# Patient Record
Sex: MALE | Race: OTHER | Employment: UNEMPLOYED | URBAN - METROPOLITAN AREA
[De-identification: names, ages, dates, MRNs, and addresses within clinical notes are randomized per-mention and may not be internally consistent; named-entity substitution may affect disease eponyms.]

---

## 2022-07-11 ENCOUNTER — HOSPITAL ENCOUNTER (EMERGENCY)
Age: 27
Discharge: HOME | End: 2022-07-12
Attending: EMERGENCY MEDICINE
Payer: COMMERCIAL

## 2022-07-11 DIAGNOSIS — S01.81XA FACIAL LACERATION, INITIAL ENCOUNTER: Primary | ICD-10-CM

## 2022-07-11 PROCEDURE — 96372 THER/PROPH/DIAG INJ SC/IM: CPT

## 2022-07-11 PROCEDURE — 90715 TDAP VACCINE 7 YRS/> IM: CPT | Performed by: EMERGENCY MEDICINE

## 2022-07-11 PROCEDURE — 90471 IMMUNIZATION ADMIN: CPT | Performed by: EMERGENCY MEDICINE

## 2022-07-11 PROCEDURE — 99284 EMERGENCY DEPT VISIT MOD MDM: CPT

## 2022-07-11 PROCEDURE — 6360000002 HC RX W HCPCS: Performed by: EMERGENCY MEDICINE

## 2022-07-11 PROCEDURE — 12013 RPR F/E/E/N/L/M 2.6-5.0 CM: CPT

## 2022-07-11 RX ADMIN — TETANUS TOXOID, REDUCED DIPHTHERIA TOXOID AND ACELLULAR PERTUSSIS VACCINE, ADSORBED 0.5 ML: 5; 2.5; 8; 8; 2.5 SUSPENSION INTRAMUSCULAR at 23:30

## 2022-07-11 ASSESSMENT — PAIN - FUNCTIONAL ASSESSMENT: PAIN_FUNCTIONAL_ASSESSMENT: NONE - DENIES PAIN

## 2022-07-12 ENCOUNTER — APPOINTMENT (OUTPATIENT)
Dept: CT IMAGING | Age: 27
End: 2022-07-12
Payer: COMMERCIAL

## 2022-07-12 VITALS
SYSTOLIC BLOOD PRESSURE: 124 MMHG | OXYGEN SATURATION: 97 % | HEIGHT: 74 IN | HEART RATE: 59 BPM | DIASTOLIC BLOOD PRESSURE: 70 MMHG | WEIGHT: 230 LBS | TEMPERATURE: 98.4 F | RESPIRATION RATE: 16 BRPM | BODY MASS INDEX: 29.52 KG/M2

## 2022-07-12 PROCEDURE — 70450 CT HEAD/BRAIN W/O DYE: CPT

## 2022-07-12 PROCEDURE — 72125 CT NECK SPINE W/O DYE: CPT

## 2022-07-12 PROCEDURE — 2500000003 HC RX 250 WO HCPCS: Performed by: PHYSICIAN ASSISTANT

## 2022-07-12 RX ORDER — LIDOCAINE HYDROCHLORIDE 10 MG/ML
20 INJECTION, SOLUTION INFILTRATION; PERINEURAL ONCE
Status: COMPLETED | OUTPATIENT
Start: 2022-07-12 | End: 2022-07-12

## 2022-07-12 RX ADMIN — LIDOCAINE HYDROCHLORIDE 20 ML: 10 INJECTION, SOLUTION INFILTRATION; PERINEURAL at 02:47

## 2022-07-12 NOTE — ED NOTES
PROCEDURE NOTE  7/12/22       Time: 0200    LACERATION REPAIR  Risks, benefits and alternatives (for applicable procedures below) described. Performed By: COOKIE Larios. Informed consent: Verbal consent obtained. The patient was counseled regarding the procedure in person, it's indications, risks, potential complications and alternatives and any questions were answered. Verbal consent was obtained. Laceration #: 1. Location:  forehead  Length: 4.5 cm. The wound area was irrigated with sterile saline, cleansed with povidone iodine and draped in a sterile fashion. Local Anesthesia:  obtained with Lidocaine 1% without epinephrine. The wound was explored with the following results: Thickness: partial thickness. No foreign bodies found. Debridement: None. Undermining: partial thickness. Wound Margins Revised: yes. Flaps Aligned: no. The wound was closed in single layer closure with #6  6-0 Ethilon using interrupted suture(s). Dressing:  bacitracin and a sterile dressing. There were no additional wounds requiring formal closure.         Brittany Larios  07/12/22 0212

## 2022-07-12 NOTE — ED PROVIDER NOTES
HPI:  7/11/22,   Time: 11:20 PM EDT         Kayleen Brock is a 24 y.o. male presenting to the ED for laceration, beginning prior to arrival. The complaint has been persistent, mild in severity, and worsened by nothing. Patient is [place]. He said that he did not get his way this evening and began hitting his head against a window until he bled. Patient has a laceration in the center of his forehead. He denies any loss of consciousness. He denies any numbness, tingling, weakness, vision changes, speech changes. He does not know if his tetanus is up-to-date. ROS:   Pertinent positives and negatives are stated within HPI, all other systems reviewed and are negative.  --------------------------------------------- PAST HISTORY ---------------------------------------------  Past Medical History:  has no past medical history on file. Past Surgical History:  has no past surgical history on file. Social History:      Family History: family history is not on file. The patients home medications have been reviewed. Allergies: Patient has no known allergies. -------------------------------------------------- RESULTS -------------------------------------------------  All laboratory and radiology results have been personally reviewed by myself   LABS:  No results found for this visit on 07/11/22. RADIOLOGY:  Interpreted by Radiologist.  [address]   Final Result   No CT evidence of an acute intracranial abnormality. No evidence of acute fracture or traumatic malalignment of the cervical spine. RECOMMENDATIONS:   Unavailable         CT CERVICAL SPINE WO CONTRAST   Final Result   No CT evidence of an acute intracranial abnormality. No evidence of acute fracture or traumatic malalignment of the cervical spine.       RECOMMENDATIONS:   Unavailable                 ---------------------------------------------------PHYSICAL fracture or traumatic malalignment of the cervical spine. RECOMMENDATIONS:   Unavailable         CT CERVICAL SPINE WO CONTRAST   Final Result   No CT evidence of an acute intracranial abnormality. No evidence of acute fracture or traumatic malalignment of the cervical spine. RECOMMENDATIONS:   Unavailable             ------------------------- NURSING NOTES AND VITALS REVIEWED ---------------------------  Date / Time Roomed:  7/11/2022 11:11 PM  ED Bed Assignment:  05/05    The nursing notes within the ED encounter and vital signs as below have been reviewed. /70   Pulse 59   Temp 98.4 °F (36.9 °C) (Oral)   Resp 16   Ht 6' 2\" (1.88 m)   Wt 230 lb (104.3 kg)   SpO2 97%   BMI 29.53 kg/m²   Oxygen Saturation Interpretation: Normal      ------------------------------------------ PROGRESS NOTES ------------------------------------------  I have spoken with the patient and discussed todays results, in addition to providing specific details for the plan of care and counseling regarding the diagnosis and prognosis. Their questions are answered at this time and they are agreeable with the plan. I discussed at length with them reasons for immediate return here for re evaluation. They will followup with primary care by calling their office tomorrow. --------------------------------- ADDITIONAL PROVIDER NOTES ---------------------------------  At this time the patient is without objective evidence of an acute process requiring hospitalization or inpatient management. They have remained hemodynamically stable throughout their entire ED visit and are stable for discharge with outpatient follow-up. The plan has been discussed in detail and they are aware of the specific conditions for emergent return, as well as the importance of follow-up. There are no discharge medications for this patient. Diagnosis:  1.  Facial laceration, initial encounter        Disposition:  Patient's disposition: Discharge to home  Patient's condition is stable.                   [address] [facility],   07/12/22 [id number]       Sarah Constantino MD  07/15/22 1388